# Patient Record
Sex: FEMALE | Race: WHITE | NOT HISPANIC OR LATINO | Employment: FULL TIME | URBAN - METROPOLITAN AREA
[De-identification: names, ages, dates, MRNs, and addresses within clinical notes are randomized per-mention and may not be internally consistent; named-entity substitution may affect disease eponyms.]

---

## 2019-09-30 ENCOUNTER — OFFICE VISIT (OUTPATIENT)
Dept: FAMILY MEDICINE | Facility: CLINIC | Age: 19
End: 2019-09-30
Payer: COMMERCIAL

## 2019-09-30 VITALS
TEMPERATURE: 99 F | OXYGEN SATURATION: 98 % | BODY MASS INDEX: 19.25 KG/M2 | HEART RATE: 105 BPM | DIASTOLIC BLOOD PRESSURE: 60 MMHG | HEIGHT: 63 IN | WEIGHT: 108.63 LBS | SYSTOLIC BLOOD PRESSURE: 100 MMHG

## 2019-09-30 DIAGNOSIS — R50.9 FEVER AND CHILLS: Primary | ICD-10-CM

## 2019-09-30 DIAGNOSIS — R52 GENERALIZED BODY ACHES: ICD-10-CM

## 2019-09-30 DIAGNOSIS — J02.9 PHARYNGITIS, UNSPECIFIED ETIOLOGY: ICD-10-CM

## 2019-09-30 PROBLEM — J30.2 SEASONAL ALLERGIC RHINITIS: Status: ACTIVE | Noted: 2019-09-30

## 2019-09-30 PROBLEM — J30.2 SEASONAL ALLERGIC RHINITIS: Status: RESOLVED | Noted: 2019-09-30 | Resolved: 2019-09-30

## 2019-09-30 PROCEDURE — 96372 THER/PROPH/DIAG INJ SC/IM: CPT | Mod: S$GLB,,, | Performed by: NURSE PRACTITIONER

## 2019-09-30 PROCEDURE — 96372 PR INJECTION,THERAP/PROPH/DIAG2ST, IM OR SUBCUT: ICD-10-PCS | Mod: S$GLB,,, | Performed by: NURSE PRACTITIONER

## 2019-09-30 PROCEDURE — 99203 OFFICE O/P NEW LOW 30 MIN: CPT | Mod: 25,S$GLB,, | Performed by: NURSE PRACTITIONER

## 2019-09-30 PROCEDURE — 99203 PR OFFICE/OUTPT VISIT, NEW, LEVL III, 30-44 MIN: ICD-10-PCS | Mod: 25,S$GLB,, | Performed by: NURSE PRACTITIONER

## 2019-09-30 RX ORDER — LISDEXAMFETAMINE DIMESYLATE 50 MG/1
50 CAPSULE ORAL EVERY MORNING
COMMUNITY
Start: 2019-09-20

## 2019-09-30 RX ORDER — BETAMETHASONE SODIUM PHOSPHATE AND BETAMETHASONE ACETATE 3; 3 MG/ML; MG/ML
6 INJECTION, SUSPENSION INTRA-ARTICULAR; INTRALESIONAL; INTRAMUSCULAR; SOFT TISSUE
Status: COMPLETED | OUTPATIENT
Start: 2019-09-30 | End: 2019-09-30

## 2019-09-30 RX ORDER — NORGESTIMATE AND ETHINYL ESTRADIOL 0.25-0.035
1 KIT ORAL DAILY
Refills: 11 | COMMUNITY
Start: 2019-09-26

## 2019-09-30 RX ADMIN — BETAMETHASONE SODIUM PHOSPHATE AND BETAMETHASONE ACETATE 6 MG: 3; 3 INJECTION, SUSPENSION INTRA-ARTICULAR; INTRALESIONAL; INTRAMUSCULAR; SOFT TISSUE at 02:09

## 2019-09-30 NOTE — PROGRESS NOTES
Clinic Note  9/30/2019      Subjective:       Patient ID:  Hui is a 19 y.o. female being seen in office today as a new patient.        Chief Complaint: Establish Care; Sore Throat (He alia's son had strep throat; ); Headache; and Generalized Body Aches    Presents to clinic today as a new patient. Normally sees Dr. Lee but could not get in today. Reports woke with with fever of 101, chills, body aches, and fatigue. Sick contacts with alia's sons who have strep and flu. Denies flu vaccine this season yet. Has taken Tylenol for fever and body aches. No other treatment.     Family History   Problem Relation Age of Onset    Lupus Mother     Rheumatologic disease Mother     Stroke Mother     No Known Problems Father      Social History     Socioeconomic History    Marital status: Single     Spouse name: Not on file    Number of children: Not on file    Years of education: Not on file    Highest education level: Not on file   Occupational History    Not on file   Social Needs    Financial resource strain: Not on file    Food insecurity:     Worry: Not on file     Inability: Not on file    Transportation needs:     Medical: Not on file     Non-medical: Not on file   Tobacco Use    Smoking status: Never Smoker   Substance and Sexual Activity    Alcohol use: Never     Frequency: Never    Drug use: Never    Sexual activity: Not on file   Lifestyle    Physical activity:     Days per week: Not on file     Minutes per session: Not on file    Stress: Not on file   Relationships    Social connections:     Talks on phone: Not on file     Gets together: Not on file     Attends [employer] service: Not on file     Active member of club or organization: Not on file     Attends meetings of clubs or organizations: Not on file     Relationship status: Not on file   Other Topics Concern    Not on file   Social History Narrative    Not on file     Past Surgical History:   Procedure Laterality Date    BREAST  "SURGERY      KNEE ARTHROSCOPY W/ ACL RECONSTRUCTION       Social History     Substance and Sexual Activity   Alcohol Use Never    Frequency: Never     Patient has no known allergies.  Medication List with Changes/Refills   Current Medications    SPRINTEC, 28, 0.25-35 MG-MCG PER TABLET    Take 1 tablet by mouth once daily.    VYVANSE 50 MG CAPSULE    Take 50 mg by mouth every morning.        Review of Systems   Constitutional: Positive for chills, fever and malaise/fatigue.   HENT: Positive for sore throat. Negative for congestion, ear pain and sinus pain.    Eyes: Positive for redness. Negative for pain and discharge.   Respiratory: Negative for cough, shortness of breath and wheezing.    Cardiovascular: Negative for chest pain and PND.   Gastrointestinal: Negative for abdominal pain, diarrhea, nausea and vomiting.   Musculoskeletal: Positive for myalgias.   Neurological: Positive for headaches. Negative for dizziness.             /60 (BP Location: Left arm, Patient Position: Sitting, BP Method: Large (Manual))   Pulse 105   Temp 99.4 °F (37.4 °C) (Oral)   Ht 5' 3" (1.6 m)   Wt 49.3 kg (108 lb 9.6 oz)   SpO2 98%   BMI 19.24 kg/m²   Estimated body mass index is 19.24 kg/m² as calculated from the following:    Height as of this encounter: 5' 3" (1.6 m).    Weight as of this encounter: 49.3 kg (108 lb 9.6 oz).    Objective:        Physical Exam   Constitutional: She is oriented to person, place, and time and well-developed, well-nourished, and in no distress. Vital signs are normal.   HENT:   Head: Normocephalic and atraumatic.   Right Ear: Tympanic membrane normal.   Left Ear: Tympanic membrane normal.   Nose: No mucosal edema, rhinorrhea or sinus tenderness.   Mouth/Throat: Uvula is midline and mucous membranes are normal. No oropharyngeal exudate, posterior oropharyngeal edema or posterior oropharyngeal erythema (Moderate amount of clear drainage noted).   Eyes: Pupils are equal, round, and reactive to " light. Conjunctivae, EOM and lids are normal.   Neurological: She is oriented to person, place, and time.         Assessment and Plan:         Hui was seen today for establish care, sore throat, headache and generalized body aches.    Diagnoses and all orders for this visit:    Fever and chills  -     POCT rapid strep A  -     POCT Influenza A/B  -     betamethasone acetate-betamethasone sodium phosphate injection 6 mg    Generalized body aches  -     POCT rapid strep A  -     POCT Influenza A/B  -     betamethasone acetate-betamethasone sodium phosphate injection 6 mg    Pharyngitis, unspecified etiology     Strep and Flu test negative today. Tylenol and Motrin for overall discomfort.        Follow up:   Follow up if symptoms worsen or fail to improve.            Mounika Somers NP

## 2019-09-30 NOTE — PATIENT INSTRUCTIONS
Gargle with warm salt water for throat irritation.  Increase fluid intake to help break up secretions.  Rest

## 2022-04-10 ENCOUNTER — HISTORICAL (OUTPATIENT)
Dept: ADMINISTRATIVE | Facility: HOSPITAL | Age: 22
End: 2022-04-10
Payer: COMMERCIAL

## 2022-04-27 VITALS
WEIGHT: 105.38 LBS | BODY MASS INDEX: 18.67 KG/M2 | SYSTOLIC BLOOD PRESSURE: 148 MMHG | OXYGEN SATURATION: 100 % | DIASTOLIC BLOOD PRESSURE: 95 MMHG | HEIGHT: 63 IN